# Patient Record
Sex: FEMALE | Race: WHITE
[De-identification: names, ages, dates, MRNs, and addresses within clinical notes are randomized per-mention and may not be internally consistent; named-entity substitution may affect disease eponyms.]

---
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
## 2024-09-28 ENCOUNTER — HOSPITAL ENCOUNTER (EMERGENCY)
Dept: HOSPITAL 92 - ERS | Age: 19
LOS: 1 days | Discharge: HOME | End: 2024-09-29
Payer: MEDICAID

## 2024-09-28 DIAGNOSIS — Z3A.01: ICD-10-CM

## 2024-09-28 DIAGNOSIS — O23.591: Primary | ICD-10-CM

## 2024-09-28 DIAGNOSIS — O99.331: ICD-10-CM

## 2024-09-28 DIAGNOSIS — F17.290: ICD-10-CM

## 2024-09-28 LAB
CAUTI INDICATIONS FOR CULTURE: (no result)
HCG UR QL: POSITIVE
PREGU CONTROL BACKGROUND?: (no result)
PREGU CONTROL BAR APPEAR?: YES
SP GR UR STRIP: 1.01 (ref 1–1.04)
WBC UR QL AUTO: (no result) HPF (ref 0–3)

## 2024-09-28 PROCEDURE — 81001 URINALYSIS AUTO W/SCOPE: CPT

## 2024-09-28 PROCEDURE — 87660 TRICHOMONAS VAGIN DIR PROBE: CPT

## 2024-09-28 PROCEDURE — 87591 N.GONORRHOEAE DNA AMP PROB: CPT

## 2024-09-28 PROCEDURE — 87510 GARDNER VAG DNA DIR PROBE: CPT

## 2024-09-28 PROCEDURE — 87480 CANDIDA DNA DIR PROBE: CPT

## 2024-09-28 PROCEDURE — 81025 URINE PREGNANCY TEST: CPT

## 2024-11-11 ENCOUNTER — HOSPITAL ENCOUNTER (EMERGENCY)
Dept: HOSPITAL 92 - ERS | Age: 19
Discharge: LEFT BEFORE DISCHARGE | End: 2024-11-11
Payer: COMMERCIAL

## 2024-11-11 DIAGNOSIS — Z53.21: Primary | ICD-10-CM

## 2024-11-11 LAB
PROT UR STRIP.AUTO-MCNC: 20 MG/DL
SP GR UR STRIP: 1.03 (ref 1–1.04)
WBC UR QL AUTO: (no result) HPF (ref 0–3)

## 2024-11-11 PROCEDURE — 81001 URINALYSIS AUTO W/SCOPE: CPT

## 2024-11-11 PROCEDURE — 87086 URINE CULTURE/COLONY COUNT: CPT
